# Patient Record
Sex: FEMALE | Race: WHITE | NOT HISPANIC OR LATINO
[De-identification: names, ages, dates, MRNs, and addresses within clinical notes are randomized per-mention and may not be internally consistent; named-entity substitution may affect disease eponyms.]

---
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
## 2021-01-02 ENCOUNTER — TRANSCRIPTION ENCOUNTER (OUTPATIENT)
Age: 21
End: 2021-01-02

## 2021-06-07 ENCOUNTER — APPOINTMENT (OUTPATIENT)
Dept: PEDIATRIC ORTHOPEDIC SURGERY | Facility: CLINIC | Age: 21
End: 2021-06-07
Payer: COMMERCIAL

## 2021-06-07 DIAGNOSIS — M41.125 ADOLESCENT IDIOPATHIC SCOLIOSIS, THORACOLUMBAR REGION: ICD-10-CM

## 2021-06-07 PROCEDURE — 99214 OFFICE O/P EST MOD 30 MIN: CPT | Mod: 25

## 2021-06-07 PROCEDURE — 72082 X-RAY EXAM ENTIRE SPI 2/3 VW: CPT

## 2021-06-25 NOTE — PHYSICAL EXAM
[Normal] : There is brisk capillary refill in the digits of the affected extremity. They are symmetric pulses in the bilateral upper and lower extremities [Ears] : normal ears [Nose] : normal nose [Lips] : normal lips [FreeTextEntry1] : Healthy appearing child. Awake, alert, in no acute distress. Pleasant and cooperative. Good respiratory effort with no audible wheezing without use of a stethoscope. Ambulates independently with no evidence of antalgia. Able to get on and off exam table without difficulty. \par \par Examination of the spine \par Upon inspection, shoulder and scapula asymmetry with the right side elevated above left was noted. Waist asymmetry with deeper right waist crease. No tenderness to palpation over the sacroiliac joints. Nontender to palpation over spinous processes and paraspinal musculature. No step-off deformities are appreciated. Has full flexion of the thoracolumbar spine and does not complain of any difficulty or pain on exam. Right thoracic prominence and left thoracolumbar prominence noted on forward bend. With hyperextension no pain is experienced. Has 5/5 strength in the lower extremities. No motor deficits in the lower leg. Sensation is intact to light touch distally. DP 2+. Capillary refill less than 2 seconds in each digit.

## 2021-06-25 NOTE — HISTORY OF PRESENT ILLNESS
[FreeTextEntry1] : Sherrie is a 20 year old female who presents today for follow up for idiopathic scoliosis. She had undergone prior attempt at bracing therapy, however the patient was noncompliant at that time.  The patient was last seen November 2019 for the same issue, at which time a discussion was had regarding potential surgical intervention, please refer to prior clinic note for details.  Patient presents today with increased back pain over last several weeks, worse in the morning and at the end of the day. Otherwise the patient is generally healthy and doing well. Still remains active but is not as active with sports as she was at the time of her last visit.  Menarche as of age 15.   Patient denies numbness, tingling, weakness to the LE, radiating LE pain, or bladder/bowel dysfunction. Father and younger sister have history of scoliosis.  They have had time to think about surgery and have elected to proceed with surgical intervention.

## 2021-06-25 NOTE — ASSESSMENT
[FreeTextEntry1] : 20 year old female with idiopathic scoliosis. Clinical exam and imaging reviewed with the family. Spinal films show the patient has a 35 degree R thoracic deformity and a 52 degree L thoracolumbar deformity. I've discussed surgical intervention to correct her deformity. Details of procedure again detailed today in office. Risk of infection and possible complications discussed.. Posterior Spine fusion with instrumentation, osteotomies, cell saver, multimodality spinal cord monitoring, bone grafting (autograft/ allograft), Thoracoplasty, Navigated guidance vs fluoroscopic guidance and complex wound closure is planned.)Parent and patient in agreement with plan of care.Perioperative plan discussed. XRs of patients operated by me in the past were shown. All risks and complications including but not limited to infection, nonunion, implant failure, resurgery, less than full correction,  paralysis (complete, incomplete, bladder/ bowel injury) shoulder imbalance, decompensation, organ injury vascular injury, mortality, csf leak, junctional arthritis, extension of fusion, visual impairment, loss of flexibility, superior mesenteric artery syndrome, paralytic ileus, revision surgery, extension of fusion explained. Wake up test discussed. Transfusion risk discussed. Neuromonitoring explained. Rib resection possibility discussed. Use of fluoroscopy and AIRO navigation explained. Infection prevention steps discussed. Post op pain management protocol discussed. Intraspinal duramorph discussed. All questions answered. Benefits and alternatives explained.\par \par Hospital stay usually is 3-4 days with one night in the PICU. We have implemented a rapid recovery pathway that incorporates microdose of intraspinal duramorph at the time of surgery, which eliminates the need for opioid PCA and decreases opioids need postop for pain control. It also decreases constipation rate and allows patients resumption of diet on the same day of surgery. Pain management is in collaboration with pediatric pain specialist. We have a dedicated intraoperative and postoperative pediatric spine team that includes pediatric spine anesthesiologist, neurologist for intraoperative real time spinal cord monitoring to increase the safety of surgery, dedicated surgical team, pediatric hospitalists,  and  along with child life team. This approach has allowed optimal management of patients, increased surgical safety, decreased risk of blood transfusion, decreased need for opioids and allows them to go home earlier. At discharge patient is able to walk and climb stairs independently and we arrange for visiting nurse services for home care. The sutures are dissolvable and wound closure is by plastic surgery, which decreases the risk of infection. We also utilize intraopaerative low dose CT scan to ensure accuracy of spinal implants. .In addition we perform multimodality spinal cord monitoring in real time which is supervised by neurophysiologist and neurologist to decrease the risk of neurological injury and improve safety of the surgical procedure. This approach has improved surgical safety, accuracy and efficiency thereby ensuring superior patient outcomes. In addition the [facility] is the only [place] certified [facility] in [facility] ensuring the highest level of nursing care.\par \par \par Risk of paralysis discussed, and precautions to prevent and monitor for for risk explained. Handout provided explaining the condition and details of care. Patient was provided with information pertinent to other young athletes who have recently undergone the same procedure. I've encouraged them to reach out for support.  At this time they feel they are ready to have surgery for this problem, but are unsure exactly the timing that works best for them.  They will think about it and call the office to let us know when they want to schedule it.  In the meantime, we will obtain full spine MRI and consultation for cardiac and pulmonary evaluation.

## 2021-06-25 NOTE — DATA REVIEWED
[de-identified] : scoliosis XRs AP and Lateral were ordered, done and then independently reviewed today. \par AP and Lateral spine x-rays show a 35 degree right thoracic deformity and a 52 degree left thoracolumbar deformity. Risser 5.

## 2024-03-01 ENCOUNTER — NON-APPOINTMENT (OUTPATIENT)
Age: 24
End: 2024-03-01